# Patient Record
Sex: MALE | URBAN - METROPOLITAN AREA
[De-identification: names, ages, dates, MRNs, and addresses within clinical notes are randomized per-mention and may not be internally consistent; named-entity substitution may affect disease eponyms.]

---

## 2018-01-25 ENCOUNTER — IMPORTED ENCOUNTER (OUTPATIENT)
Dept: URBAN - METROPOLITAN AREA CLINIC 59 | Facility: CLINIC | Age: 68
End: 2018-01-25

## 2018-01-25 PROBLEM — H25.12 NUCLEAR SCLEROSIS CATARACT OF LT EYE: Noted: 2018-01-25

## 2018-01-25 PROBLEM — H43.393 OTHER VITREOUS OPACITIES, BILATERAL: Noted: 2018-01-25

## 2018-01-25 PROBLEM — H25.11 NUCLEAR SCLEROSIS CATARACT OF RT EYE: Noted: 2018-01-25

## 2018-01-25 PROCEDURE — 99204 OFFICE O/P NEW MOD 45 MIN: CPT

## 2018-01-25 PROCEDURE — 92136 OPHTHALMIC BIOMETRY: CPT

## 2018-02-07 ENCOUNTER — IMPORTED ENCOUNTER (OUTPATIENT)
Dept: URBAN - METROPOLITAN AREA CLINIC 59 | Facility: CLINIC | Age: 68
End: 2018-02-07

## 2018-02-07 PROCEDURE — 66984 XCAPSL CTRC RMVL W/O ECP: CPT

## 2018-02-08 ENCOUNTER — IMPORTED ENCOUNTER (OUTPATIENT)
Dept: URBAN - METROPOLITAN AREA CLINIC 59 | Facility: CLINIC | Age: 68
End: 2018-02-08

## 2018-02-08 PROBLEM — Z96.1 PRESENCE OF PSEUDOPHAKIA: Noted: 2018-02-08

## 2018-02-08 PROCEDURE — 99024 POSTOP FOLLOW-UP VISIT: CPT

## 2018-02-15 ENCOUNTER — IMPORTED ENCOUNTER (OUTPATIENT)
Dept: URBAN - METROPOLITAN AREA CLINIC 59 | Facility: CLINIC | Age: 68
End: 2018-02-15

## 2018-02-15 PROBLEM — Z96.1 PRESENCE OF PSEUDOPHAKIA: Noted: 2018-02-15

## 2018-02-15 PROCEDURE — 99024 POSTOP FOLLOW-UP VISIT: CPT

## 2018-03-08 ENCOUNTER — IMPORTED ENCOUNTER (OUTPATIENT)
Dept: URBAN - METROPOLITAN AREA CLINIC 59 | Facility: CLINIC | Age: 68
End: 2018-03-08

## 2018-03-08 PROBLEM — Z96.1 PRESENCE OF PSEUDOPHAKIA: Noted: 2018-03-08

## 2018-03-08 PROCEDURE — 99024 POSTOP FOLLOW-UP VISIT: CPT

## 2018-12-10 ENCOUNTER — IMPORTED ENCOUNTER (OUTPATIENT)
Dept: URBAN - METROPOLITAN AREA CLINIC 59 | Facility: CLINIC | Age: 68
End: 2018-12-10

## 2018-12-10 PROBLEM — H25.11 NUCLEAR SCLEROSIS CATARACT OF RT EYE: Noted: 2018-12-10

## 2018-12-10 PROBLEM — H43.393 OTHER VITREOUS OPACITIES, BILATERAL: Noted: 2018-12-10

## 2018-12-10 PROCEDURE — 92014 COMPRE OPH EXAM EST PT 1/>: CPT

## 2019-12-09 ENCOUNTER — IMPORTED ENCOUNTER (OUTPATIENT)
Dept: URBAN - METROPOLITAN AREA CLINIC 59 | Facility: CLINIC | Age: 69
End: 2019-12-09

## 2019-12-09 PROBLEM — H26.492 OTHER SECONDARY CATARACT, LEFT EYE: Noted: 2019-12-09

## 2019-12-09 PROBLEM — H43.393 OTHER VITREOUS OPACITIES, BILATERAL: Noted: 2019-12-09

## 2019-12-09 PROBLEM — H43.393 FLOATERS: Noted: 2019-12-09

## 2019-12-09 PROBLEM — I10 PRIMARY HYPERTENSION: Noted: 2019-12-09

## 2019-12-09 PROBLEM — H25.11 NUCLEAR SCLEROSIS CATARACT OF RT EYE: Noted: 2019-12-09

## 2019-12-09 PROCEDURE — 92014 COMPRE OPH EXAM EST PT 1/>: CPT

## 2023-10-15 ASSESSMENT — VISUAL ACUITY
OS_BAT: 20/400
OD_BAT: 20/50
OD_SC: 20/30
OS_SC: 20/25-2
OD_SC: 20/40-2
OS_BAT: 20/80
OS_SC: J7
OD_SC: 20/50
OD_CC: 20/20
OS_SC: 20/25
OS_SC: J3-
OS_SC: 20/25
OD_CC: 20/20
OS_CC: 20/80
OS_SC: J7
OS_SC: 20/40+1
OS_SC: 20/50+2
OS_CC: 20/25
OD_BAT: 20/60

## 2023-10-15 ASSESSMENT — PACHYMETRY
OS_CT_UM: C:  FINAL: 0.000; P:
OD_CT_UM: C:  FINAL: 0.000; P:
OD_CT_UM: C:  FINAL: 0.000; P:
OS_CT_UM: C:  FINAL: 0.000; P:

## 2023-10-15 ASSESSMENT — TONOMETRY
OD_IOP_MMHG: 14
OS_IOP_MMHG: 15
OD_IOP_MMHG: 12
OS_IOP_MMHG: 14
OS_IOP_MMHG: 17
OS_IOP_MMHG: 16
OS_IOP_MMHG: 18
OD_IOP_MMHG: 16

## 2023-10-15 ASSESSMENT — KERATOMETRY
OD_K2POWER_DIOPTERS: 46.77
OS_AXISANGLE2_DEGREES: 101
OS_AXISANGLE_DEGREES: 11
OD_K1POWER_DIOPTERS: 46.15
OD_AXISANGLE2_DEGREES: 72
OS_K2POWER_DIOPTERS: 47.75
OS_K1POWER_DIOPTERS: 46.71

## 2023-12-05 ENCOUNTER — APPOINTMENT (RX ONLY)
Dept: URBAN - METROPOLITAN AREA CLINIC 341 | Facility: CLINIC | Age: 73
Setting detail: DERMATOLOGY
End: 2023-12-05

## 2023-12-05 DIAGNOSIS — L30.9 DERMATITIS, UNSPECIFIED: ICD-10-CM | Status: INADEQUATELY CONTROLLED

## 2023-12-05 PROCEDURE — ? BIOPSY BY SHAVE METHOD

## 2023-12-05 PROCEDURE — ? TREATMENT REGIMEN

## 2023-12-05 PROCEDURE — ? COUNSELING

## 2023-12-05 PROCEDURE — 11102 TANGNTL BX SKIN SINGLE LES: CPT

## 2023-12-05 PROCEDURE — ? PRESCRIPTION MEDICATION MANAGEMENT

## 2023-12-05 PROCEDURE — ? PRESCRIPTION

## 2023-12-05 RX ORDER — IVERMECTIN 3 MG/1
TABLET ORAL
Qty: 12 | Refills: 0 | Status: ERX | COMMUNITY
Start: 2023-12-05

## 2023-12-05 RX ORDER — CLOBETASOL PROPIONATE 0.05 G/ML
SPRAY TOPICAL
Qty: 125 | Refills: 2 | Status: ERX | COMMUNITY
Start: 2023-12-05

## 2023-12-05 RX ADMIN — CLOBETASOL PROPIONATE: 0.05 SPRAY TOPICAL at 00:00

## 2023-12-05 RX ADMIN — IVERMECTIN: 3 TABLET ORAL at 00:00

## 2023-12-05 ASSESSMENT — LOCATION ZONE DERM: LOCATION ZONE: TRUNK

## 2023-12-05 ASSESSMENT — LOCATION SIMPLE DESCRIPTION DERM
LOCATION SIMPLE: LEFT UPPER BACK
LOCATION SIMPLE: RIGHT LOWER BACK

## 2023-12-05 ASSESSMENT — LOCATION DETAILED DESCRIPTION DERM
LOCATION DETAILED: RIGHT INFERIOR MEDIAL LOWER BACK
LOCATION DETAILED: LEFT MEDIAL UPPER BACK

## 2023-12-05 NOTE — PROCEDURE: BIOPSY BY SHAVE METHOD
Detail Level: Detailed
Depth Of Biopsy: dermis
Was A Bandage Applied: Yes
Size Of Lesion In Cm: 0
Biopsy Type: H and E
Biopsy Method: Dermablade
Anesthesia Type: 1% lidocaine with epinephrine
Anesthesia Volume In Cc: 0.5
Hemostasis: Aluminum Chloride
Wound Care: Petrolatum
Dressing: bandage
Destruction After The Procedure: No
Type Of Destruction Used: Curettage
Curettage Text: The wound bed was treated with curettage after the biopsy was performed.
Cryotherapy Text: The wound bed was treated with cryotherapy after the biopsy was performed.
Electrodesiccation Text: The wound bed was treated with electrodesiccation after the biopsy was performed.
Electrodesiccation And Curettage Text: The wound bed was treated with electrodesiccation and curettage after the biopsy was performed.
Silver Nitrate Text: The wound bed was treated with silver nitrate after the biopsy was performed.
Lab: [id number]
Lab Facility: [id number]
Consent: Verbal consent was obtained and risks were reviewed including but not limited to scarring, infection, bleeding, scabbing, incomplete removal, nerve damage and allergy to anesthesia.
Post-Care Instructions: I reviewed with the patient in detail post-care instructions. Patient is to keep the biopsy site dry overnight, and then apply bacitracin twice daily until healed. Patient may apply hydrogen peroxide soaks to remove any crusting.
Notification Instructions: Patient will be notified of biopsy results. However, patient instructed to call the office if not contacted within 2 weeks.
Billing Type: Third-Party Bill
Information: Selecting Yes will display possible errors in your note based on the variables you have selected. This validation is only offered as a suggestion for you. PLEASE NOTE THAT THE VALIDATION TEXT WILL BE REMOVED WHEN YOU FINALIZE YOUR NOTE. IF YOU WANT TO FAX A PRELIMINARY NOTE YOU WILL NEED TO TOGGLE THIS TO 'NO' IF YOU DO NOT WANT IT IN YOUR FAXED NOTE.
no

## 2023-12-05 NOTE — PROCEDURE: PRESCRIPTION MEDICATION MANAGEMENT
Continue Regimen: Triamcinolone bid x 2 weeks to affected areas - patient has already been using script from PCP
Detail Level: Zone
Initiate Treatment: Clobetasol solution - use daily on the back put in spray bottle bid \\nIvermectin 3 mg take 6 pills today and repeat in one week .
Render In Strict Bullet Format?: No

## 2024-01-05 ENCOUNTER — APPOINTMENT (RX ONLY)
Dept: URBAN - METROPOLITAN AREA CLINIC 341 | Facility: CLINIC | Age: 74
Setting detail: DERMATOLOGY
End: 2024-01-05

## 2024-01-05 DIAGNOSIS — L20.89 OTHER ATOPIC DERMATITIS: ICD-10-CM | Status: IMPROVED

## 2024-01-05 PROCEDURE — 99213 OFFICE O/P EST LOW 20 MIN: CPT

## 2024-01-05 PROCEDURE — ? PRESCRIPTION MEDICATION MANAGEMENT

## 2024-01-05 PROCEDURE — ? OTC TREATMENT REGIMEN

## 2024-01-05 PROCEDURE — ? COUNSELING

## 2024-01-05 ASSESSMENT — LOCATION SIMPLE DESCRIPTION DERM: LOCATION SIMPLE: RIGHT LOWER BACK

## 2024-01-05 ASSESSMENT — LOCATION DETAILED DESCRIPTION DERM: LOCATION DETAILED: RIGHT INFERIOR MEDIAL LOWER BACK

## 2024-01-05 ASSESSMENT — LOCATION ZONE DERM: LOCATION ZONE: TRUNK

## 2024-01-05 NOTE — PROCEDURE: OTC TREATMENT REGIMEN
Patient Specific Otc Recommendations (Will Not Stick From Patient To Patient): Recommended antihistamine ( Allegra ) daily
Detail Level: Zone

## 2024-01-05 NOTE — PROCEDURE: PRESCRIPTION MEDICATION MANAGEMENT
Samples Given: Sernivo - apply twice daily to the affected area of the body X 2 weeks as needed for flares
Modify Regimen: Triamcinolone- twice daily to the affected areas of the body X 2 weeks, then as needed for flares
Render In Strict Bullet Format?: No
Detail Level: Zone

## 2024-04-16 ENCOUNTER — APPOINTMENT (RX ONLY)
Dept: URBAN - METROPOLITAN AREA CLINIC 341 | Facility: CLINIC | Age: 74
Setting detail: DERMATOLOGY
End: 2024-04-16

## 2024-04-16 DIAGNOSIS — L20.89 OTHER ATOPIC DERMATITIS: ICD-10-CM | Status: INADEQUATELY CONTROLLED

## 2024-04-16 PROCEDURE — 99214 OFFICE O/P EST MOD 30 MIN: CPT

## 2024-04-16 PROCEDURE — ? PRESCRIPTION MEDICATION MANAGEMENT

## 2024-04-16 PROCEDURE — ? PRESCRIPTION

## 2024-04-16 PROCEDURE — ? OTC TREATMENT REGIMEN

## 2024-04-16 PROCEDURE — ? COUNSELING

## 2024-04-16 RX ORDER — CLOBETASOL PROPIONATE 0.5 MG/G
CREAM TOPICAL
Qty: 45 | Refills: 3 | Status: ERX | COMMUNITY
Start: 2024-04-16

## 2024-04-16 RX ADMIN — CLOBETASOL PROPIONATE: 0.5 CREAM TOPICAL at 00:00

## 2024-04-16 ASSESSMENT — LOCATION SIMPLE DESCRIPTION DERM
LOCATION SIMPLE: ABDOMEN
LOCATION SIMPLE: LEFT THIGH
LOCATION SIMPLE: RIGHT LOWER BACK
LOCATION SIMPLE: RIGHT HAND
LOCATION SIMPLE: LEFT PRETIBIAL REGION
LOCATION SIMPLE: RIGHT FOREARM
LOCATION SIMPLE: RIGHT THIGH
LOCATION SIMPLE: LEFT HAND
LOCATION SIMPLE: RIGHT PRETIBIAL REGION
LOCATION SIMPLE: LEFT FOREARM

## 2024-04-16 ASSESSMENT — LOCATION DETAILED DESCRIPTION DERM
LOCATION DETAILED: XIPHOID
LOCATION DETAILED: LEFT ANTERIOR PROXIMAL THIGH
LOCATION DETAILED: LEFT RIB CAGE
LOCATION DETAILED: RIGHT ANTERIOR DISTAL THIGH
LOCATION DETAILED: LEFT DISTAL PRETIBIAL REGION
LOCATION DETAILED: RIGHT PROXIMAL DORSAL FOREARM
LOCATION DETAILED: LEFT RADIAL DORSAL HAND
LOCATION DETAILED: LEFT PROXIMAL DORSAL FOREARM
LOCATION DETAILED: RIGHT DISTAL PRETIBIAL REGION
LOCATION DETAILED: RIGHT RADIAL DORSAL HAND
LOCATION DETAILED: RIGHT INFERIOR MEDIAL LOWER BACK

## 2024-04-16 ASSESSMENT — LOCATION ZONE DERM
LOCATION ZONE: HAND
LOCATION ZONE: TRUNK
LOCATION ZONE: ARM
LOCATION ZONE: LEG

## 2024-04-16 NOTE — PROCEDURE: PRESCRIPTION MEDICATION MANAGEMENT
Initiate Treatment: clobetasol 0.05 % topical cream - Apply to affected areas of the body twice daily x 2 weeks then once every other day for maintenance
Discontinue Regimen: Triamcinolone- twice daily to the affected areas of the body X 2 weeks, then as needed for flares
Samples Given: Sernivo - apply twice daily to the affected area of the body X 2 weeks as needed for flares
Render In Strict Bullet Format?: No
Detail Level: Zone

## 2024-05-21 ENCOUNTER — APPOINTMENT (RX ONLY)
Dept: URBAN - METROPOLITAN AREA CLINIC 341 | Facility: CLINIC | Age: 74
Setting detail: DERMATOLOGY
End: 2024-05-21

## 2024-05-21 DIAGNOSIS — L20.89 OTHER ATOPIC DERMATITIS: ICD-10-CM | Status: IMPROVED

## 2024-05-21 PROCEDURE — ? PRESCRIPTION MEDICATION MANAGEMENT

## 2024-05-21 PROCEDURE — ? OTHER

## 2024-05-21 PROCEDURE — 99214 OFFICE O/P EST MOD 30 MIN: CPT

## 2024-05-21 PROCEDURE — ? OTC TREATMENT REGIMEN

## 2024-05-21 PROCEDURE — ? COUNSELING

## 2024-05-21 ASSESSMENT — LOCATION SIMPLE DESCRIPTION DERM
LOCATION SIMPLE: RIGHT LOWER BACK
LOCATION SIMPLE: ABDOMEN
LOCATION SIMPLE: RIGHT THIGH
LOCATION SIMPLE: LEFT THIGH
LOCATION SIMPLE: RIGHT PRETIBIAL REGION
LOCATION SIMPLE: LEFT HAND
LOCATION SIMPLE: LEFT PRETIBIAL REGION
LOCATION SIMPLE: RIGHT HAND

## 2024-05-21 ASSESSMENT — LOCATION ZONE DERM
LOCATION ZONE: HAND
LOCATION ZONE: TRUNK
LOCATION ZONE: LEG

## 2024-05-21 ASSESSMENT — LOCATION DETAILED DESCRIPTION DERM
LOCATION DETAILED: RIGHT INFERIOR MEDIAL LOWER BACK
LOCATION DETAILED: RIGHT ANTERIOR DISTAL THIGH
LOCATION DETAILED: LEFT ANTERIOR PROXIMAL THIGH
LOCATION DETAILED: RIGHT RADIAL DORSAL HAND
LOCATION DETAILED: RIGHT DISTAL PRETIBIAL REGION
LOCATION DETAILED: XIPHOID
LOCATION DETAILED: LEFT DISTAL PRETIBIAL REGION
LOCATION DETAILED: LEFT RADIAL DORSAL HAND
LOCATION DETAILED: LEFT RIB CAGE

## 2024-05-21 NOTE — PROCEDURE: PRESCRIPTION MEDICATION MANAGEMENT
Render In Strict Bullet Format?: No
Continue Regimen: clobetasol 0.05 % topical cream - Apply to affected areas of the body twice daily x 2 weeks then once every other day for maintenance
Detail Level: Zone

## 2024-05-21 NOTE — PROCEDURE: OTC TREATMENT REGIMEN
Patient Specific Otc Recommendations (Will Not Stick From Patient To Patient): Recommended antihistamine ( Allegra ) daily, but pt states that it does not help for the itching
Detail Level: Zone

## 2024-05-21 NOTE — PROCEDURE: OTHER
Detail Level: Detailed
Note Text (......Xxx Chief Complaint.): This diagnosis correlates with the
Other (Free Text): Dermatitis has improved. Back has not changed much because he is unable to reach his back. Will go purchase a spatula or tool to help apply. Pt will schedule an appointment for patch testing in house.
Render Risk Assessment In Note?: no

## 2024-07-02 ENCOUNTER — APPOINTMENT (RX ONLY)
Dept: URBAN - METROPOLITAN AREA CLINIC 341 | Facility: CLINIC | Age: 74
Setting detail: DERMATOLOGY
End: 2024-07-02

## 2024-07-02 DIAGNOSIS — Z02.9 ENCOUNTER FOR ADMINISTRATIVE EXAMINATIONS, UNSPECIFIED: ICD-10-CM

## 2024-07-02 PROCEDURE — ? ADDITIONAL NOTES

## 2024-07-02 NOTE — PROCEDURE: ADDITIONAL NOTES
Additional Notes: Pt scheduled incorrectly. He is supposed to schedule 3 office visits for patch testing. Will reschedule.
Render Risk Assessment In Note?: no
Detail Level: Simple

## 2024-08-26 ENCOUNTER — APPOINTMENT (RX ONLY)
Dept: URBAN - METROPOLITAN AREA CLINIC 341 | Facility: CLINIC | Age: 74
Setting detail: DERMATOLOGY
End: 2024-08-26

## 2024-08-26 DIAGNOSIS — L23.9 ALLERGIC CONTACT DERMATITIS, UNSPECIFIED CAUSE: ICD-10-CM | Status: INADEQUATELY CONTROLLED

## 2024-08-26 PROCEDURE — 95044 PATCH/APPLICATION TESTS: CPT

## 2024-08-26 PROCEDURE — ? PATCH TESTING

## 2024-08-26 PROCEDURE — ? COUNSELING

## 2024-08-26 ASSESSMENT — LOCATION SIMPLE DESCRIPTION DERM: LOCATION SIMPLE: UPPER BACK

## 2024-08-26 ASSESSMENT — LOCATION DETAILED DESCRIPTION DERM: LOCATION DETAILED: SUPERIOR THORACIC SPINE

## 2024-08-26 ASSESSMENT — LOCATION ZONE DERM: LOCATION ZONE: TRUNK

## 2024-08-26 NOTE — PROCEDURE: PATCH TESTING
Consent: Verbal consent obtained, risks reviewed including but not limited to rash, itching, allergic reaction, systemic rash, remote possiblity of anaphylaxis to allergen.
Detail Level: None
Number Of Patches (Maximum Allowable Per Dos By Cms Is 90): 36
Post-Care Instructions: I reviewed with the patient in detail post-care instructions. Patient should not sweat, pick at, or get the patches wet for 48 hours.

## 2024-08-28 ENCOUNTER — APPOINTMENT (RX ONLY)
Dept: URBAN - METROPOLITAN AREA CLINIC 341 | Facility: CLINIC | Age: 74
Setting detail: DERMATOLOGY
End: 2024-08-28

## 2024-08-28 DIAGNOSIS — L23.9 ALLERGIC CONTACT DERMATITIS, UNSPECIFIED CAUSE: ICD-10-CM | Status: INADEQUATELY CONTROLLED

## 2024-08-28 PROCEDURE — ? COUNSELING

## 2024-08-28 PROCEDURE — 99212 OFFICE O/P EST SF 10 MIN: CPT

## 2024-08-28 PROCEDURE — ? TRUE TEST READING

## 2024-08-28 ASSESSMENT — LOCATION DETAILED DESCRIPTION DERM: LOCATION DETAILED: RIGHT SUPERIOR MEDIAL UPPER BACK

## 2024-08-28 ASSESSMENT — LOCATION SIMPLE DESCRIPTION DERM: LOCATION SIMPLE: RIGHT UPPER BACK

## 2024-08-28 ASSESSMENT — LOCATION ZONE DERM: LOCATION ZONE: TRUNK

## 2024-08-28 NOTE — PROCEDURE: TRUE TEST READING
7 - Colophony: no reaction
Number Of Patches Read: 3
Show Negative Results In The Note?: Yes
Show Allergen Counseling In The Note?: No
36 - 2-Bromo-2-Nitropropane-1,3-Diol (Bronopol): 2+
Detail Level: Zone
28 - Gold Sodium Thiosulfate: +/-

## 2024-08-30 ENCOUNTER — APPOINTMENT (RX ONLY)
Dept: URBAN - METROPOLITAN AREA CLINIC 341 | Facility: CLINIC | Age: 74
Setting detail: DERMATOLOGY
End: 2024-08-30

## 2024-08-30 DIAGNOSIS — L23.9 ALLERGIC CONTACT DERMATITIS, UNSPECIFIED CAUSE: ICD-10-CM | Status: STABLE

## 2024-08-30 PROCEDURE — 99213 OFFICE O/P EST LOW 20 MIN: CPT

## 2024-08-30 PROCEDURE — ? COUNSELING

## 2024-08-30 PROCEDURE — ? TREATMENT REGIMEN

## 2024-08-30 PROCEDURE — ? PRESCRIPTION MEDICATION MANAGEMENT

## 2024-08-30 PROCEDURE — ? TRUE TEST READING

## 2024-08-30 NOTE — PROCEDURE: PRESCRIPTION MEDICATION MANAGEMENT
Continue Regimen: TAC- apply bid for 2 weeks on body prn for flares\\nClobetasol- apply bid for 2 weeks for hot spots
Detail Level: Zone
Render In Strict Bullet Format?: No

## 2024-08-30 NOTE — PROCEDURE: TRUE TEST READING
19 - Methyl Dibromo Glutaronitrile: no reaction
Detail Level: Zone
36 - 2-Bromo-2-Nitropropane-1,3-Diol (Bronopol): 1+
Number Of Patches Read: 36
Show Negative Results In The Note?: Yes
What Reading Time Point?: 24 hour

## 2024-10-16 ENCOUNTER — RX ONLY (OUTPATIENT)
Age: 74
Setting detail: RX ONLY
End: 2024-10-16

## 2024-10-16 RX ORDER — TRIAMCINOLONE ACETONIDE 1 MG/G
CREAM TOPICAL
Qty: 80 | Refills: 1 | Status: ERX | COMMUNITY
Start: 2024-10-16

## 2025-01-30 ENCOUNTER — NEW PATIENT COMPREHENSIVE (OUTPATIENT)
Dept: URBAN - METROPOLITAN AREA CLINIC 22 | Facility: CLINIC | Age: 75
End: 2025-01-30

## 2025-01-30 DIAGNOSIS — H26.492: ICD-10-CM

## 2025-01-30 DIAGNOSIS — H43.393: ICD-10-CM

## 2025-01-30 DIAGNOSIS — H25.811: ICD-10-CM

## 2025-01-30 PROCEDURE — 92004 COMPRE OPH EXAM NEW PT 1/>: CPT

## 2025-01-30 ASSESSMENT — VISUAL ACUITY
OD_SC: 20/100
OS_SC: 20/50
OD_PH: 20/50

## 2025-01-30 ASSESSMENT — TONOMETRY
OS_IOP_MMHG: 13
OD_IOP_MMHG: 15

## 2025-02-19 ENCOUNTER — SURGERY/PROCEDURE (OUTPATIENT)
Dept: URBAN - METROPOLITAN AREA SURGICAL CENTER 55 | Facility: SURGICAL CENTER | Age: 75
End: 2025-02-19

## 2025-02-19 DIAGNOSIS — H26.492: ICD-10-CM

## 2025-02-19 PROCEDURE — 66821 AFTER CATARACT LASER SURGERY: CPT

## 2025-08-20 ENCOUNTER — SURGERY/PROCEDURE (OUTPATIENT)
Dept: URBAN - METROPOLITAN AREA SURGICAL CENTER 55 | Facility: SURGICAL CENTER | Age: 75
End: 2025-08-20

## 2025-08-20 DIAGNOSIS — H25.811: ICD-10-CM

## 2025-08-20 PROCEDURE — 66984 XCAPSL CTRC RMVL W/O ECP: CPT

## 2025-08-21 ENCOUNTER — 1 DAY POST-OP (OUTPATIENT)
Dept: URBAN - METROPOLITAN AREA CLINIC 22 | Facility: CLINIC | Age: 75
End: 2025-08-21

## 2025-08-21 DIAGNOSIS — Z96.1: ICD-10-CM

## 2025-08-21 PROCEDURE — 99024 POSTOP FOLLOW-UP VISIT: CPT

## 2025-08-21 ASSESSMENT — VISUAL ACUITY
OD_PH: 20/60+2
OS_SC: 20/40+1
OD_SC: 20/100+2
OS_PH: 20/20-1

## 2025-08-21 ASSESSMENT — TONOMETRY
OD_IOP_MMHG: 27
OS_IOP_MMHG: 17

## 2025-09-03 ENCOUNTER — 2 WEEK POST-OP (OUTPATIENT)
Dept: URBAN - METROPOLITAN AREA CLINIC 22 | Facility: CLINIC | Age: 75
End: 2025-09-03

## 2025-09-03 DIAGNOSIS — Z96.1: ICD-10-CM

## 2025-09-03 PROCEDURE — 99024 POSTOP FOLLOW-UP VISIT: CPT

## 2025-09-03 ASSESSMENT — VISUAL ACUITY
OD_SC: 20/25-1
OS_SC: 20/25-2

## 2025-09-03 ASSESSMENT — TONOMETRY
OD_IOP_MMHG: 15
OS_IOP_MMHG: 15

## (undated) RX ORDER — PREDNISOLONE ACETATE 10 MG/ML: 1 SUSPENSION/ DROPS OPHTHALMIC